# Patient Record
Sex: MALE | Race: WHITE | URBAN - METROPOLITAN AREA
[De-identification: names, ages, dates, MRNs, and addresses within clinical notes are randomized per-mention and may not be internally consistent; named-entity substitution may affect disease eponyms.]

---

## 2023-03-24 ENCOUNTER — TELEPHONE (OUTPATIENT)
Dept: UROLOGY | Age: 73
End: 2023-03-24

## 2023-03-24 NOTE — TELEPHONE ENCOUNTER
[facility] called and would like to add a new person to list to be seen. Can patient be added and seen there or needs in office visit for first time?

## 2023-03-24 NOTE — TELEPHONE ENCOUNTER
If the are NOT in a skilled bed I can see them there as a new patient, but what do they need seen for? Is it something urgent?

## 2023-03-27 NOTE — TELEPHONE ENCOUNTER
[facility] called and tried to schedule patient but staff informed writer that he is not willing to travel for any appointment. This was not conveyed initially.

## 2023-03-27 NOTE — TELEPHONE ENCOUNTER
Patient is considered long term stay at [facility] AT [facility] and is being seen post hospital stay for UTI.

## 2023-03-27 NOTE — TELEPHONE ENCOUNTER
We can see him during rounds, but I would suggest we see him in the office first since he was just in the hospital. I would recommend in the next 1-2 weeks

## 2023-03-28 NOTE — TELEPHONE ENCOUNTER
Brynn Dickson, care coordinator advised of response. She and staff will try and get resident to agree to come to office.  Patient was added to nursing home list.

## 2023-03-28 NOTE — TELEPHONE ENCOUNTER
This is against medical advice. Add him to facility rounds the next time we are there, but I strongly encourgage him to be seen sooner due to hospitalization.

## 2023-05-16 ENCOUNTER — OFFICE VISIT (OUTPATIENT)
Dept: UROLOGY | Age: 73
End: 2023-05-16

## 2023-05-16 ENCOUNTER — TELEPHONE (OUTPATIENT)
Dept: UROLOGY | Age: 73
End: 2023-05-16

## 2023-05-16 VITALS — SYSTOLIC BLOOD PRESSURE: 104 MMHG | DIASTOLIC BLOOD PRESSURE: 60 MMHG

## 2023-05-16 DIAGNOSIS — R31.0 GROSS HEMATURIA: ICD-10-CM

## 2023-05-16 DIAGNOSIS — N40.1 BPH WITH OBSTRUCTION/LOWER URINARY TRACT SYMPTOMS: Primary | ICD-10-CM

## 2023-05-16 DIAGNOSIS — R33.9 INCOMPLETE BLADDER EMPTYING: ICD-10-CM

## 2023-05-16 DIAGNOSIS — N13.8 BPH WITH OBSTRUCTION/LOWER URINARY TRACT SYMPTOMS: Primary | ICD-10-CM

## 2023-05-16 DIAGNOSIS — N39.0 FREQUENT UTI: ICD-10-CM

## 2023-05-16 DIAGNOSIS — N18.32 STAGE 3B CHRONIC KIDNEY DISEASE (HCC): ICD-10-CM

## 2023-05-16 DIAGNOSIS — N36.8 URETHRAL EROSION: ICD-10-CM

## 2023-05-16 RX ORDER — FLUTICASONE PROPIONATE 50 MCG
1 SPRAY, SUSPENSION (ML) NASAL DAILY
COMMUNITY

## 2023-05-16 RX ORDER — BACLOFEN 10 MG/1
10 TABLET ORAL 3 TIMES DAILY
COMMUNITY

## 2023-05-16 RX ORDER — GUAIFENESIN 600 MG/1
600 TABLET, EXTENDED RELEASE ORAL 2 TIMES DAILY
COMMUNITY

## 2023-05-16 RX ORDER — ASCORBIC ACID 500 MG
500 TABLET ORAL 2 TIMES DAILY
COMMUNITY

## 2023-05-16 RX ORDER — TAMSULOSIN HYDROCHLORIDE 0.4 MG/1
0.4 CAPSULE ORAL DAILY
COMMUNITY
End: 2023-05-17 | Stop reason: SDUPTHER

## 2023-05-16 RX ORDER — AMMONIUM LACTATE 12 G/100G
CREAM TOPICAL PRN
COMMUNITY

## 2023-05-16 RX ORDER — FINASTERIDE 5 MG/1
5 TABLET, FILM COATED ORAL DAILY
COMMUNITY

## 2023-05-16 RX ORDER — LOPERAMIDE HYDROCHLORIDE 2 MG/1
2 CAPSULE ORAL 4 TIMES DAILY PRN
COMMUNITY

## 2023-05-16 RX ORDER — GABAPENTIN 100 MG/1
100 CAPSULE ORAL 2 TIMES DAILY
COMMUNITY

## 2023-05-16 RX ORDER — DIPHENHYDRAMINE HCL 25 MG
25 CAPSULE ORAL EVERY 6 HOURS PRN
COMMUNITY

## 2023-05-16 RX ORDER — M-VIT,TX,IRON,MINS/CALC/FOLIC 27MG-0.4MG
1 TABLET ORAL DAILY
COMMUNITY

## 2023-05-16 RX ORDER — MIRTAZAPINE 15 MG/1
15 TABLET, FILM COATED ORAL NIGHTLY
COMMUNITY

## 2023-05-16 RX ORDER — KETOCONAZOLE 20 MG/G
CREAM TOPICAL DAILY
COMMUNITY

## 2023-05-16 RX ORDER — FAMOTIDINE 20 MG/1
20 TABLET, FILM COATED ORAL DAILY
COMMUNITY

## 2023-05-16 ASSESSMENT — ENCOUNTER SYMPTOMS: CONSTIPATION: 0

## 2023-05-16 NOTE — PATIENT INSTRUCTIONS
SURVEY:    You may be receiving a survey from [employer] regarding your visit today. Please complete the survey to enable us to provide the highest quality of care to you and your family. If you cannot score us a very good on any question, please call the office to discuss how we could have made your experience a very good one. Thank you.

## 2023-05-16 NOTE — TELEPHONE ENCOUNTER
When this apt was made did anyone call and request records? I dont have any urine cultures, hospital records, etc.....     New patient apt today

## 2023-05-16 NOTE — PROGRESS NOTES
HPI:          Patient is a 67 y.o. male in no acute distress. He is alert and oriented to person. 9/2022 hospitalized for gross hematuria and clot retention with history of CVA on Coumadin and found to have INR of 3. On hospital admission CT showed high density mass in the bladder measuring 7 x 6.8 cm which may represent a large hematoma or combination of hematoma and bladder mass. 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 There is some high attenuation material in the dilated distal left ureter that could represent blood products or mass as well. Left perinephric inflammation. He did undergo a cystoscopy, clot evacuation, bladder biopsy and fulguration. This Flomax started while hospitalized. 10/2022 ultrasound showed no hydronephrosis which was likely secondary to obstructing organized clot in renal function returned to baseline. Pathology from [facility] showed inflammatory changes, no malignancy. Urine cytology showed small group of atypical cells. No convincing evidence for malignancy. Started Proscar    2/2023 admitted for altered mental status with 1 episode of seizure, urosepsis due to urinary tract infection and scrotal infection, acute kidney injury, hydronephrosis with urinary retention. CT showed urothelial thickening and mild inflammatory changes adjacent to the bilateral renal pelves similar to prior study from 9/2022. On flomax and proscar    Urine cultures  3/2023 - pseudomonas  2/26/2023 - angela glabrata  2/16/2023 - Streptococcus agalactiae group B  9/2022 - Proteus, nonhemolytic strep    New patient referral from [facility] and urinary tract infections. Patient is a poor historian. History obtained from nursing staff and review of the chart through [facility]. All history reviewed and summarized above.   According to the staff the Downing catheter has been out

## 2023-05-17 RX ORDER — TAMSULOSIN HYDROCHLORIDE 0.4 MG/1
0.4 CAPSULE ORAL 2 TIMES DAILY
Qty: 180 CAPSULE | Refills: 3 | DISCHARGE
Start: 2023-05-17

## 2023-05-23 DIAGNOSIS — R33.9 INCOMPLETE BLADDER EMPTYING: ICD-10-CM

## 2023-05-23 DIAGNOSIS — N18.32 STAGE 3B CHRONIC KIDNEY DISEASE (HCC): ICD-10-CM

## 2023-05-23 DIAGNOSIS — N39.0 FREQUENT UTI: ICD-10-CM

## 2023-06-16 ENCOUNTER — TELEPHONE (OUTPATIENT)
Dept: UROLOGY | Age: 73
End: 2023-06-16

## 2023-08-15 ENCOUNTER — OFFICE VISIT (OUTPATIENT)
Dept: UROLOGY | Age: 73
End: 2023-08-15

## 2023-08-15 VITALS — DIASTOLIC BLOOD PRESSURE: 80 MMHG | SYSTOLIC BLOOD PRESSURE: 124 MMHG

## 2023-08-15 DIAGNOSIS — N13.8 BPH WITH OBSTRUCTION/LOWER URINARY TRACT SYMPTOMS: ICD-10-CM

## 2023-08-15 DIAGNOSIS — N40.1 BPH WITH OBSTRUCTION/LOWER URINARY TRACT SYMPTOMS: ICD-10-CM

## 2023-08-15 DIAGNOSIS — N39.0 FREQUENT UTI: Primary | ICD-10-CM

## 2023-08-15 DIAGNOSIS — N18.32 STAGE 3B CHRONIC KIDNEY DISEASE (HCC): ICD-10-CM

## 2023-08-15 DIAGNOSIS — R33.9 INCOMPLETE BLADDER EMPTYING: ICD-10-CM

## 2023-08-15 ASSESSMENT — ENCOUNTER SYMPTOMS
BACK PAIN: 0
WHEEZING: 0
SHORTNESS OF BREATH: 0
VOMITING: 0
COUGH: 0
COLOR CHANGE: 0
CONSTIPATION: 0
EYE REDNESS: 0
ABDOMINAL PAIN: 0
NAUSEA: 0

## 2023-08-15 NOTE — PROGRESS NOTES
HPI:          Patient is a 67 y.o. male in no acute distress. He is alert and oriented to person. 9/2022 hospitalized for gross hematuria and clot retention with history of CVA on Coumadin and found to have INR of 3. On hospital admission CT showed high density mass in the bladder measuring 7 x 6.8 cm which may represent a large hematoma or combination of hematoma and bladder mass. 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 There is some high attenuation material in the dilated distal left ureter that could represent blood products or mass as well. Left perinephric inflammation. He did undergo a cystoscopy, clot evacuation, bladder biopsy and fulguration. This Flomax started while hospitalized. 10/2022 ultrasound showed no hydronephrosis which was likely secondary to obstructing organized clot in renal function returned to baseline. Pathology from [facility] showed inflammatory changes, no malignancy. Urine cytology showed small group of atypical cells. No convincing evidence for malignancy. Started Proscar    2/2023 admitted for altered mental status with 1 episode of seizure, urosepsis due to urinary tract infection and scrotal infection, acute kidney injury, hydronephrosis with urinary retention. CT showed urothelial thickening and mild inflammatory changes adjacent to the bilateral renal pelves similar to prior study from 9/2022. On flomax and proscar    Urine cultures  3/2023 - pseudomonas  2/26/2023 - angela glabrata  2/16/2023 - Streptococcus agalactiae group B  9/2022 - Proteus, nonhemolytic strep    5/2023 referral from [facility] AT [facility] for urinary retention and urinary tract infections. Patient is a poor historian. History obtained from nursing staff and review of the chart through [facility]. All history reviewed and summarized above.   According to the staff the Downing catheter has been out for

## 2023-11-28 RX ORDER — MIRTAZAPINE 15 MG/1
15 TABLET, FILM COATED ORAL NIGHTLY
COMMUNITY
Start: 2023-01-05 | End: 2023-11-28 | Stop reason: SDUPTHER

## 2023-11-28 NOTE — PROGRESS NOTES
HPI:          Patient is a 67 y.o. male in no acute distress. He is alert and oriented to person. History  9/2022 hospitalized for gross hematuria and clot retention with history of CVA on Coumadin and found to have INR of 3. On hospital admission CT showed high density mass in the bladder measuring 7 x 6.8 cm which may represent a large hematoma or combination of hematoma and bladder mass. 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 There is some high attenuation material in the dilated distal left ureter that could represent blood products or mass as well. Left perinephric inflammation. He did undergo a cystoscopy, clot evacuation, bladder biopsy and fulguration. This Flomax started while hospitalized. 10/2022 ultrasound showed no hydronephrosis which was likely secondary to obstructing organized clot in renal function returned to baseline. Pathology from [facility] showed inflammatory changes, no malignancy. Urine cytology showed small group of atypical cells. No convincing evidence for malignancy. Started Proscar    2/2023 admitted for altered mental status with 1 episode of seizure, urosepsis due to urinary tract infection and scrotal infection, acute kidney injury, hydronephrosis with urinary retention. CT showed urothelial thickening and mild inflammatory changes adjacent to the bilateral renal pelves similar to prior study from 9/2022. On flomax and proscar    Urine cultures  3/2023 - pseudomonas  2/26/2023 - angela glabrata  2/16/2023 - Streptococcus agalactiae group B  9/2022 - Proteus, nonhemolytic strep    5/2023 referral from [facility] AT [facility] for urinary retention and urinary tract infections. Patient is a poor historian. History obtained from nursing staff and review of the chart through [facility]. All history reviewed and summarized above.   According to the staff the Downing catheter has been

## 2023-12-01 ENCOUNTER — OFFICE VISIT (OUTPATIENT)
Dept: UROLOGY | Age: 73
End: 2023-12-01

## 2023-12-01 VITALS — SYSTOLIC BLOOD PRESSURE: 108 MMHG | DIASTOLIC BLOOD PRESSURE: 68 MMHG

## 2023-12-01 DIAGNOSIS — N18.32 STAGE 3B CHRONIC KIDNEY DISEASE (HCC): ICD-10-CM

## 2023-12-01 DIAGNOSIS — N40.1 BPH WITH OBSTRUCTION/LOWER URINARY TRACT SYMPTOMS: ICD-10-CM

## 2023-12-01 DIAGNOSIS — R33.9 INCOMPLETE BLADDER EMPTYING: ICD-10-CM

## 2023-12-01 DIAGNOSIS — N39.0 FREQUENT UTI: Primary | ICD-10-CM

## 2023-12-01 DIAGNOSIS — N13.8 BPH WITH OBSTRUCTION/LOWER URINARY TRACT SYMPTOMS: ICD-10-CM

## 2023-12-01 ASSESSMENT — ENCOUNTER SYMPTOMS
VOMITING: 0
COUGH: 0
ABDOMINAL PAIN: 0
COLOR CHANGE: 0
NAUSEA: 0
CONSTIPATION: 0
SHORTNESS OF BREATH: 0
EYE REDNESS: 0
BACK PAIN: 0
WHEEZING: 0

## 2023-12-01 NOTE — PATIENT INSTRUCTIONS
SURVEY:    You may be receiving a survey from [employer] regarding your visit today. Please complete the survey to enable us to provide the highest quality of care to you and your family. If you cannot score us a very good on any question, please call the office to discuss how we could have made your experience a very good one. Thank you.

## 2024-03-19 ENCOUNTER — OFFICE VISIT (OUTPATIENT)
Dept: UROLOGY | Age: 74
End: 2024-03-19

## 2024-03-19 VITALS — DIASTOLIC BLOOD PRESSURE: 65 MMHG | SYSTOLIC BLOOD PRESSURE: 110 MMHG

## 2024-03-19 DIAGNOSIS — N39.0 FREQUENT UTI: ICD-10-CM

## 2024-03-19 DIAGNOSIS — R33.9 INCOMPLETE BLADDER EMPTYING: Primary | ICD-10-CM

## 2024-03-19 DIAGNOSIS — R33.9 INCOMPLETE BLADDER EMPTYING: ICD-10-CM

## 2024-03-19 DIAGNOSIS — N18.32 STAGE 3B CHRONIC KIDNEY DISEASE (HCC): ICD-10-CM

## 2024-03-19 DIAGNOSIS — N40.1 BPH WITH OBSTRUCTION/LOWER URINARY TRACT SYMPTOMS: ICD-10-CM

## 2024-03-19 DIAGNOSIS — N13.8 BPH WITH OBSTRUCTION/LOWER URINARY TRACT SYMPTOMS: ICD-10-CM

## 2024-03-19 NOTE — PROGRESS NOTES
He is alert and oriented to person and place.       History  9/2022 hospitalized for gross hematuria and clot retention with history of CVA on Coumadin and found to have INR of 3.  On hospital admission CT showed high density mass in the bladder measuring 7 x 6.8 cm which may represent a large hematoma or combination of hematoma and bladder mass.  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  There is some high attenuation material in the dilated distal left ureter that could represent blood products or mass as well.  Left perinephric inflammation.  He did undergo a cystoscopy, clot evacuation, bladder biopsy and fulguration.  This Flomax started while hospitalized.    10/2022 ultrasound showed no hydronephrosis which was likely secondary to obstructing organized clot in renal function returned to baseline.  Pathology from [facility] showed inflammatory changes, no malignancy.  Urine cytology showed small group of atypical cells.  No convincing evidence for malignancy.   Started Proscar    2/2023 admitted for altered mental status with 1 episode of seizure, urosepsis due to urinary tract infection and scrotal infection, acute kidney injury, hydronephrosis with urinary retention.  CT showed urothelial thickening and mild inflammatory changes adjacent to the bilateral renal pelves similar to prior study from 9/2022.   On flomax and proscar    Urine cultures  3/2023 - pseudomonas  2/26/2023 - angela glabrata  2/16/2023 - Streptococcus agalactiae group B  9/2022 - Proteus, nonhemolytic strep    5/2023 referral from [facility] for urinary retention and urinary tract infections.  Patient is a poor historian.  History obtained from nursing staff and review of the chart through Care Everywhere.  All history reviewed and summarized above.  According to the staff the Downing catheter has been out for some time.  He is incontinent at baseline.

## 2024-03-19 NOTE — PROGRESS NOTES
Random bladderscan performed at the nursing facility today:  Pt last voided 30 mins ago, scan = 536 mL

## 2024-06-19 RX ORDER — FERROUS SULFATE 325(65) MG
325 TABLET ORAL
COMMUNITY

## 2024-06-25 ENCOUNTER — OFFICE VISIT (OUTPATIENT)
Dept: UROLOGY | Age: 74
End: 2024-06-25
Payer: MEDICARE

## 2024-06-25 VITALS — SYSTOLIC BLOOD PRESSURE: 100 MMHG | DIASTOLIC BLOOD PRESSURE: 78 MMHG

## 2024-06-25 DIAGNOSIS — N39.0 FREQUENT UTI: ICD-10-CM

## 2024-06-25 DIAGNOSIS — N13.8 BPH WITH OBSTRUCTION/LOWER URINARY TRACT SYMPTOMS: ICD-10-CM

## 2024-06-25 DIAGNOSIS — R33.9 INCOMPLETE BLADDER EMPTYING: Primary | ICD-10-CM

## 2024-06-25 DIAGNOSIS — N40.1 BPH WITH OBSTRUCTION/LOWER URINARY TRACT SYMPTOMS: ICD-10-CM

## 2024-06-25 DIAGNOSIS — N18.32 STAGE 3B CHRONIC KIDNEY DISEASE (HCC): ICD-10-CM

## 2024-06-25 PROCEDURE — 51798 US URINE CAPACITY MEASURE: CPT | Performed by: NURSE PRACTITIONER

## 2024-06-25 PROCEDURE — 99308 SBSQ NF CARE LOW MDM 20: CPT | Performed by: NURSE PRACTITIONER

## 2024-06-25 NOTE — PROGRESS NOTES
He is alert and oriented to person and place.       History  9/2022 hospitalized for gross hematuria and clot retention with history of CVA on Coumadin and found to have INR of 3.  On hospital admission CT showed high density mass in the bladder measuring 7 x 6.8 cm which may represent a large hematoma or combination of hematoma and bladder mass.  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  There is some high attenuation material in the dilated distal left ureter that could represent blood products or mass as well.  Left perinephric inflammation.  He did undergo a cystoscopy, clot evacuation, bladder biopsy and fulguration.  This Flomax started while hospitalized.    10/2022 ultrasound showed no hydronephrosis which was likely secondary to obstructing organized clot in renal function returned to baseline.  Pathology from [facility] showed inflammatory changes, no malignancy.  Urine cytology showed small group of atypical cells.  No convincing evidence for malignancy.   Started Proscar    2/2023 admitted for altered mental status with 1 episode of seizure, urosepsis due to urinary tract infection and scrotal infection, acute kidney injury, hydronephrosis with urinary retention.  CT showed urothelial thickening and mild inflammatory changes adjacent to the bilateral renal pelves similar to prior study from 9/2022.   On flomax and proscar    Urine cultures  3/2023 - pseudomonas  2/26/2023 - angela glabrata  2/16/2023 - Streptococcus agalactiae group B  9/2022 - Proteus, nonhemolytic strep    5/2023 referral from [facility] for urinary retention and urinary tract infections.  Patient is a poor historian.  History obtained from nursing staff and review of the chart through Care Everywhere.  All history reviewed and summarized above.  According to the staff the Downing catheter has been out for some time.  He is incontinent at baseline.

## 2025-02-04 ENCOUNTER — TELEPHONE (OUTPATIENT)
Dept: UROLOGY | Age: 75
End: 2025-02-04

## 2025-02-04 RX ORDER — LACOSAMIDE 100 MG/1
100 TABLET ORAL 2 TIMES DAILY
COMMUNITY

## 2025-02-04 NOTE — TELEPHONE ENCOUNTER
Called and spoke with the patient's sonRaghu regarding his fathers appointment that was scheduled today at [facility].   Writer advised the son that the Urology group did go the the nursing facility today to see him/patients, however when we arrived to see him and other residents, no one was brought to the wellness/exam room to be seen.  Multiple [facility] staff was notified of the visit prior to appointment times, previous days and just prior to arrival today.  Urology staff waited for an extended period of time.  This has been a reoccuring issue there for we will not be seeing the patient at the facility any further, we will schedule future appointments in the office.  The son voiced an understanding.    We will contact Leti at [facility] and get him scheduled for a follow up in the urology office.

## 2025-02-14 NOTE — TELEPHONE ENCOUNTER
Called and spoke with Leti, she advised that the patient is refusing to schedule an appointment.   They are aware this is against medical advise.